# Patient Record
Sex: MALE | Race: WHITE | NOT HISPANIC OR LATINO | URBAN - METROPOLITAN AREA
[De-identification: names, ages, dates, MRNs, and addresses within clinical notes are randomized per-mention and may not be internally consistent; named-entity substitution may affect disease eponyms.]

---

## 2018-03-13 ENCOUNTER — HOSPITAL ENCOUNTER (EMERGENCY)
Facility: HOSPITAL | Age: 33
Discharge: HOME | End: 2018-03-14
Attending: EMERGENCY MEDICINE
Payer: COMMERCIAL

## 2018-03-13 DIAGNOSIS — K64.8 INTERNAL HEMORRHOIDS: Primary | ICD-10-CM

## 2018-03-13 RX ORDER — LORAZEPAM 0.5 MG/1
0.5 TABLET ORAL
Refills: 0 | COMMUNITY
Start: 2018-03-02

## 2018-03-13 RX ORDER — FINASTERIDE 1 MG/1
1 TABLET, FILM COATED ORAL
Refills: 3 | COMMUNITY
Start: 2018-03-02

## 2018-03-13 RX ORDER — DEXTROAMPHETAMINE SACCHARATE, AMPHETAMINE ASPARTATE, DEXTROAMPHETAMINE SULFATE AND AMPHETAMINE SULFATE 2.5; 2.5; 2.5; 2.5 MG/1; MG/1; MG/1; MG/1
10 TABLET ORAL
Refills: 0 | COMMUNITY
Start: 2018-03-02

## 2018-03-13 RX ORDER — ALPRAZOLAM 1 MG/1
1 TABLET, EXTENDED RELEASE ORAL 2 TIMES DAILY
Refills: 0 | COMMUNITY
Start: 2018-03-02

## 2018-03-14 VITALS
SYSTOLIC BLOOD PRESSURE: 153 MMHG | HEART RATE: 71 BPM | OXYGEN SATURATION: 95 % | TEMPERATURE: 98.1 F | RESPIRATION RATE: 19 BRPM | DIASTOLIC BLOOD PRESSURE: 81 MMHG

## 2018-03-14 PROCEDURE — 63700000 HC SELF-ADMINISTRABLE DRUG: Performed by: PHYSICIAN ASSISTANT

## 2018-03-14 PROCEDURE — 99283 EMERGENCY DEPT VISIT LOW MDM: CPT | Mod: 25

## 2018-03-14 RX ORDER — LORAZEPAM 1 MG/1
1 TABLET ORAL ONCE
Status: COMPLETED | OUTPATIENT
Start: 2018-03-14 | End: 2018-03-14

## 2018-03-14 RX ORDER — HYDROCORTISONE 25 MG/G
CREAM TOPICAL 2 TIMES DAILY
Qty: 28.35 G | Refills: 0 | Status: SHIPPED | OUTPATIENT
Start: 2018-03-13 | End: 2018-03-23

## 2018-03-14 RX ADMIN — LORAZEPAM 1 MG: 1 TABLET ORAL at 00:39

## 2018-03-14 ASSESSMENT — ENCOUNTER SYMPTOMS
MYALGIAS: 0
CHEST TIGHTNESS: 0
VOMITING: 0
CONSTIPATION: 1
RECTAL PAIN: 1
NAUSEA: 0
HEADACHES: 0
COLOR CHANGE: 0
ABDOMINAL PAIN: 0
LIGHT-HEADEDNESS: 0
BLOOD IN STOOL: 1
HEMATURIA: 0
CHILLS: 0
FEVER: 0
DIFFICULTY URINATING: 0
COUGH: 0
DIARRHEA: 0
SHORTNESS OF BREATH: 0

## 2018-03-14 NOTE — DISCHARGE INSTRUCTIONS
Use warm sitz baths to relieve hemorrhoid pain. These are found over the counter at any pharmacy. You may also take a stool softener to avoid constipation. Do not wait to have a bowel movement, as this can cause further problems.    Return to the Emergency Room if you experience worsening pain, profuse rectal bleeding, dizziness, lightheadedness, abdominal pain, persistent vomiting or any other concerning symptoms.

## 2018-03-14 NOTE — ED PROVIDER NOTES
HPI     Chief Complaint   Patient presents with   • Hemorrhoids     Pt states that he has had rectal pain for 4 days       32-year-old male presents for evaluation of rectal pain.  He states began 4 days ago after a bout of diarrhea.  He notes severe pain with bowel movements, and bright red blood.  Patient is worse with sitting and lying down.  Minimal pain with standing.  Patient denies any abdominal pain, notes abdominal fullness, like he has to have a bowel movement, but has been scared to have a BM due to pain.        History provided by:  Patient       Patient History     History reviewed. No pertinent past medical history.    History reviewed. No pertinent surgical history.    History reviewed. No pertinent family history.    Social History   Substance Use Topics   • Smoking status: Never Smoker   • Smokeless tobacco: Never Used   • Alcohol use Yes      Comment: socially       Systems Reviewed from Nursing Triage:  Tobacco  Meds  Problems  Med Hx  Surg Hx  Soc Hx         Review of Systems     Review of Systems   Constitutional: Negative for chills and fever.   Respiratory: Negative for cough, chest tightness and shortness of breath.    Cardiovascular: Negative for chest pain.   Gastrointestinal: Positive for blood in stool, constipation and rectal pain. Negative for abdominal pain, diarrhea, nausea and vomiting.   Genitourinary: Negative for decreased urine volume, difficulty urinating and hematuria.   Musculoskeletal: Negative for myalgias.   Skin: Negative for color change.   Neurological: Negative for light-headedness and headaches.        Physical Exam     ED Triage Vitals   Temp Heart Rate Resp BP SpO2   03/13/18 2045 03/13/18 2045 03/13/18 2045 03/13/18 2045 03/13/18 2045   36.7 °C (98 °F) 64 18 (!) 142/64 97 %      Temp Source Heart Rate Source Patient Position BP Location FiO2 (%) (Set)   03/13/18 2045 -- 03/14/18 0037 03/14/18 0037 --   Tympanic  Standing Left upper arm                       Patient Vitals for the past 24 hrs:   BP Temp Temp src Pulse Resp SpO2   03/14/18 0037 (!) 153/81 36.7 °C (98.1 °F) Tympanic 71 19 95 %   03/13/18 2045 (!) 142/64 36.7 °C (98 °F) Tympanic 64 18 97 %           Physical Exam   Constitutional: He appears well-developed and well-nourished.   Cardiovascular: Normal rate and regular rhythm.    Pulmonary/Chest: Effort normal.   Abdominal: Soft. Bowel sounds are normal. There is no tenderness.   Genitourinary: Rectal exam shows external hemorrhoid (one, small, non-thrombosed), internal hemorrhoid and tenderness.   Neurological: He is alert.   Skin: Skin is warm and dry.   Nursing note and vitals reviewed.           Procedures    ED Course & MDM     Labs Reviewed - No data to display    No orders to display           MDM         ED Course          Clinical Impressions as of Mar 14 1710   Internal hemorrhoids        VANESA Kaiser  03/14/18 1710

## 2018-03-14 NOTE — ED ATTESTATION NOTE
I have personally seen and examined the patient.  I reviewed and agree with the PA/NP's assessment and plan of care, with the following exceptions: None     My examination, assessment, and plan of care of Eusebio Bone is as follows:        I was physically present for the key/critical portions of the following procedures: None    Patient has history of hemorrhoids as any increased pain over the last 3-4 days.  Has a long history of constipation he has been taking fiber as per his doctor.  No rectal bleeding just cannot sleep due to pain and anal spasm    Exam patient standing up is comfortable in no acute distress skin is warm and dry rectal was performed by my physician's assistant there is a small external hemorrhoid nonthrombosed    Plan we will give Anusol HC and discussed good anal hygiene and good fiber in his diet.  Patient is to follow-up with GI if symptoms are not better in the next few days     Barrie Olivier MD  03/14/18 3595

## 2019-06-11 PROBLEM — Z00.00 ENCOUNTER FOR PREVENTIVE HEALTH EXAMINATION: Status: ACTIVE | Noted: 2019-06-11

## 2019-06-21 ENCOUNTER — APPOINTMENT (OUTPATIENT)
Dept: PHYSICAL MEDICINE AND REHAB | Facility: CLINIC | Age: 34
End: 2019-06-21

## 2019-07-25 ENCOUNTER — APPOINTMENT (OUTPATIENT)
Dept: PHYSICAL MEDICINE AND REHAB | Facility: CLINIC | Age: 34
End: 2019-07-25